# Patient Record
Sex: FEMALE | Race: WHITE | NOT HISPANIC OR LATINO | Employment: OTHER | URBAN - METROPOLITAN AREA
[De-identification: names, ages, dates, MRNs, and addresses within clinical notes are randomized per-mention and may not be internally consistent; named-entity substitution may affect disease eponyms.]

---

## 2024-01-25 PROBLEM — J30.89 ENVIRONMENTAL AND SEASONAL ALLERGIES: Status: ACTIVE | Noted: 2024-01-25

## 2024-01-25 PROBLEM — K11.1 SUBMANDIBULAR GLAND HYPERTROPHY: Status: ACTIVE | Noted: 2024-01-25

## 2024-01-25 PROBLEM — H93.13 BILATERAL TINNITUS: Status: ACTIVE | Noted: 2024-01-25

## 2024-01-25 PROBLEM — H69.93 DYSFUNCTION OF BOTH EUSTACHIAN TUBES: Status: ACTIVE | Noted: 2024-01-25

## 2024-01-25 PROBLEM — J34.89 NASAL DRAINAGE: Status: ACTIVE | Noted: 2024-01-25

## 2024-01-25 PROBLEM — H60.313 CHRONIC DIFFUSE OTITIS EXTERNA OF BOTH EARS: Status: ACTIVE | Noted: 2024-01-25

## 2024-01-25 PROBLEM — R09.81 NASAL CONGESTION: Status: ACTIVE | Noted: 2024-01-25

## 2024-01-25 PROBLEM — K11.8 SUBMANDIBULAR GLAND MASS: Status: ACTIVE | Noted: 2024-01-25

## 2024-01-25 PROBLEM — K11.5 SIALOLITHIASIS: Status: ACTIVE | Noted: 2024-01-25

## 2024-01-25 PROBLEM — K11.20 SIALADENITIS: Status: ACTIVE | Noted: 2024-01-25

## 2024-01-25 PROBLEM — H61.23 EXCESSIVE EAR WAX, BILATERAL: Status: ACTIVE | Noted: 2024-01-25

## 2024-01-25 PROBLEM — E04.1 SOLITARY THYROID NODULE: Status: ACTIVE | Noted: 2024-01-25

## 2024-02-14 ENCOUNTER — OFFICE VISIT (OUTPATIENT)
Dept: OTOLARYNGOLOGY | Facility: CLINIC | Age: 74
End: 2024-02-14
Payer: MEDICARE

## 2024-02-14 VITALS — BODY MASS INDEX: 38.83 KG/M2 | WEIGHT: 180 LBS | HEIGHT: 57 IN

## 2024-02-14 DIAGNOSIS — H90.0 CONDUCTIVE HEARING LOSS, BILATERAL: ICD-10-CM

## 2024-02-14 DIAGNOSIS — H61.23 EXCESSIVE EAR WAX, BILATERAL: Primary | ICD-10-CM

## 2024-02-14 PROBLEM — I49.9 CARDIAC ARRHYTHMIA: Status: ACTIVE | Noted: 2019-07-19

## 2024-02-14 PROBLEM — G47.33 OSA ON CPAP: Status: ACTIVE | Noted: 2019-07-19

## 2024-02-14 PROBLEM — E04.9 NONTOXIC GOITER, UNSPECIFIED: Status: ACTIVE | Noted: 2019-07-19

## 2024-02-14 PROCEDURE — 69210 REMOVE IMPACTED EAR WAX UNI: CPT | Performed by: STUDENT IN AN ORGANIZED HEALTH CARE EDUCATION/TRAINING PROGRAM

## 2024-02-14 PROCEDURE — 99213 OFFICE O/P EST LOW 20 MIN: CPT | Performed by: STUDENT IN AN ORGANIZED HEALTH CARE EDUCATION/TRAINING PROGRAM

## 2024-02-14 PROCEDURE — 1159F MED LIST DOCD IN RCRD: CPT | Performed by: STUDENT IN AN ORGANIZED HEALTH CARE EDUCATION/TRAINING PROGRAM

## 2024-02-14 PROCEDURE — 1036F TOBACCO NON-USER: CPT | Performed by: STUDENT IN AN ORGANIZED HEALTH CARE EDUCATION/TRAINING PROGRAM

## 2024-02-14 RX ORDER — METFORMIN HYDROCHLORIDE 500 MG/1
500 TABLET, EXTENDED RELEASE ORAL
COMMUNITY
Start: 2023-12-28

## 2024-02-14 RX ORDER — SOTALOL HYDROCHLORIDE 80 MG/1
80 TABLET ORAL EVERY 12 HOURS
COMMUNITY

## 2024-02-14 RX ORDER — VIT C/E/ZN/COPPR/LUTEIN/ZEAXAN 250MG-90MG
25 CAPSULE ORAL DAILY
COMMUNITY
Start: 2019-04-11

## 2024-02-14 RX ORDER — OMEPRAZOLE 40 MG/1
40 CAPSULE, DELAYED RELEASE ORAL
COMMUNITY
Start: 2023-12-28

## 2024-02-14 RX ORDER — ASPIRIN 81 MG/1
81 TABLET ORAL DAILY
COMMUNITY

## 2024-02-14 NOTE — PROGRESS NOTES
Assessment  Bilateral sensorineural hearing loss  Bilateral tinnitus  Bilateral cerumen impaction     Plan  Bilateral cerumen impaction removed, preventive measures discussed.  Hearing returned to baseline following cerumen removal.  Audiogram performed 8/14/23 notable for bilateral sensorineural hearing loss with minimal changes when compared to audiogram performed 1 year ago. Treatment alternatives discussed, at this time the patient is not interested in hearing aids.  Masking techniques for tinnitus management discussed  We will monitor her hearing with yearly audiograms  Fu 6m         History of Present Illness  2/14/23   The patient presents for follow-up, reports developing bilateral cerumen buildup affecting her hearing, no other otologic complaints.  Recall   8/14/23  The patient present for follow-up, reports developing left cerumen buildup affecting hearing.   Endorses bilateral high-frequency tinnitus, no other otologic complaint.  Recall   72-year-old female presenting for initial evaluation of bilateral cerumen impaction and hearing loss. She is a former patient of Dr. Bright who has recently retired.  The patient reports developing bilateral cerumen buildup for the past months, this is interfering with her hearing.  Has a history of bilateral sensorineural hearing loss and is due for yearly audiogram.  Endorses occasional bilateral high-frequency tinnitus, otherwise no otologic complaints.   Data reviewed:   Dr. Bright note 2/24/22  Audiogram 3/10/21    Past Medical History:   Diagnosis Date    Disease of stomach and duodenum, unspecified     Stomach problems    Obstructive sleep apnea (adult) (pediatric)     ANTOINE (obstructive sleep apnea)    Other conditions influencing health status     Malignant neoplasm of left female breast, unspecified site of breast    Personal history of other drug therapy     COVID-19 vaccine series completed    Tachycardia, unspecified     Tachycardia       Past Surgical  History:   Procedure Laterality Date    BREAST LUMPECTOMY  06/08/2016    Right Breast Lumpectomy    BREAST SURGERY  06/08/2016    Breast Surgery    HAND SURGERY  06/08/2016    Hand Surgery                                                                                                                                                          TONSILLECTOMY  06/08/2016    Tonsillectomy With Adenoidectomy         Current Outpatient Medications on File Prior to Visit   Medication Sig Dispense Refill    cholecalciferol (Vitamin D-3) 25 MCG (1000 UT) capsule Take 1 capsule (25 mcg) by mouth once daily.      metFORMIN  mg 24 hr tablet Take 1 tablet (500 mg) by mouth once daily in the evening. Take with meals.      omeprazole (PriLOSEC) 40 mg DR capsule Take 1 capsule (40 mg) by mouth once daily in the morning. Take before meals.      aspirin 81 mg EC tablet Take 1 tablet (81 mg) by mouth once daily.      sotalol (Betapace) 80 mg tablet Take 1 tablet (80 mg) by mouth every 12 hours.       No current facility-administered medications on file prior to visit.        Allergies   Allergen Reactions    Amoxicillin-Pot Clavulanate Unknown    Nabumetone Unknown    Sulfamethoxazole Unknown    Sulfamethoxazole-Trimethoprim Unknown        Review of Systems  A detailed 12 point ROS was performed and is negative except as noted in the intake form, HPI and/or Past Medical History      Physical Exam  CONSTITUTIONAL: Well-developed, NAD  VOICE: Normal voice quality  RESPIRATION: Breathing comfortably, no stridor.  CV: No clubbing/cyanosis/edema in hands.  EYES: EOM Intact, sclera normal.  NEURO: Alert and oriented times 3, Cranial nerves V,VII intact and symmetric bilaterally.  HEAD AND FACE: Symmetric facial features, no masses or lesions, sinuses nontender to palpation.  SALIVARY GLANDS: Parotid and submandibular glands normal bilaterally.  + EARS: Normal external ears  Bilateral EACs with cerumen impaction (removed/see procedure  note)  Right EAC patent, tympanic membrane intact  Left EAC patent, tympanic membrane intact   NOSE: External nose midline, anterior rhinoscopy is normal with limited visualization to the anterior aspect of the interior turbinates. No lesions noted.  ORAL CAVITY/OROPHARYNX/LIPS: Normal mucous membranes, normal floor of mouth/tongue/OP, no masses or lesions are noted.  PHARYNGEAL WALLS AND NASOPHARYNX: No masses noted. Mucosa appears clean and moist  NECK/LYMPH: No LAD, no thyroid masses. Trachea palpably midline  SKIN: Neck skin is without injury  PSYCH: Alert and oriented with appropriate mood and affect    Procedure: Removal of impacted cerumen, bilateral  Indication: Cerumen impaction.   The procedure was reviewed and explained.  Verbal consent was obtained.  With the use of the otoscope the right ear was examined, cerumen was cleaned with the use of curettes and suction.  Attention was turned to the left ear, with the use of the otoscope the left ear was examined, cerumen was cleaned with the use of curettes and suction. The patient tolerated the procedure well.  Hearing returned to baseline bilaterally following cerumen removal.

## 2024-09-16 ENCOUNTER — APPOINTMENT (OUTPATIENT)
Dept: AUDIOLOGY | Facility: CLINIC | Age: 74
End: 2024-09-16
Payer: MEDICARE

## 2024-09-16 ENCOUNTER — APPOINTMENT (OUTPATIENT)
Dept: OTOLARYNGOLOGY | Facility: CLINIC | Age: 74
End: 2024-09-16
Payer: MEDICARE

## 2024-09-27 ENCOUNTER — APPOINTMENT (OUTPATIENT)
Dept: OTOLARYNGOLOGY | Facility: CLINIC | Age: 74
End: 2024-09-27
Payer: MEDICARE

## 2024-09-27 ENCOUNTER — APPOINTMENT (OUTPATIENT)
Dept: AUDIOLOGY | Facility: CLINIC | Age: 74
End: 2024-09-27
Payer: MEDICARE

## 2024-11-20 ENCOUNTER — APPOINTMENT (OUTPATIENT)
Dept: AUDIOLOGY | Facility: CLINIC | Age: 74
End: 2024-11-20
Payer: MEDICARE

## 2024-11-20 ENCOUNTER — APPOINTMENT (OUTPATIENT)
Dept: OTOLARYNGOLOGY | Facility: CLINIC | Age: 74
End: 2024-11-20
Payer: MEDICARE

## 2024-11-20 VITALS — BODY MASS INDEX: 38.83 KG/M2 | HEIGHT: 57 IN | WEIGHT: 180 LBS

## 2024-11-20 DIAGNOSIS — H90.3 SENSORINEURAL HEARING LOSS (SNHL) OF BOTH EARS: Primary | ICD-10-CM

## 2024-11-20 DIAGNOSIS — H93.13 BILATERAL TINNITUS: ICD-10-CM

## 2024-11-20 DIAGNOSIS — H90.3 SENSORINEURAL HEARING LOSS (SNHL) OF BOTH EARS: ICD-10-CM

## 2024-11-20 DIAGNOSIS — H61.23 BILATERAL IMPACTED CERUMEN: Primary | ICD-10-CM

## 2024-11-20 DIAGNOSIS — H69.93 DYSFUNCTION OF BOTH EUSTACHIAN TUBES: ICD-10-CM

## 2024-11-20 DIAGNOSIS — R42 DIZZINESS: ICD-10-CM

## 2024-11-20 PROCEDURE — 99214 OFFICE O/P EST MOD 30 MIN: CPT | Performed by: STUDENT IN AN ORGANIZED HEALTH CARE EDUCATION/TRAINING PROGRAM

## 2024-11-20 PROCEDURE — 69210 REMOVE IMPACTED EAR WAX UNI: CPT | Performed by: STUDENT IN AN ORGANIZED HEALTH CARE EDUCATION/TRAINING PROGRAM

## 2024-11-20 PROCEDURE — 3008F BODY MASS INDEX DOCD: CPT | Performed by: STUDENT IN AN ORGANIZED HEALTH CARE EDUCATION/TRAINING PROGRAM

## 2024-11-20 PROCEDURE — 92567 TYMPANOMETRY: CPT | Performed by: AUDIOLOGIST

## 2024-11-20 PROCEDURE — 92557 COMPREHENSIVE HEARING TEST: CPT | Performed by: AUDIOLOGIST

## 2024-11-20 PROCEDURE — 1159F MED LIST DOCD IN RCRD: CPT | Performed by: STUDENT IN AN ORGANIZED HEALTH CARE EDUCATION/TRAINING PROGRAM

## 2024-11-20 PROCEDURE — 1036F TOBACCO NON-USER: CPT | Performed by: STUDENT IN AN ORGANIZED HEALTH CARE EDUCATION/TRAINING PROGRAM

## 2024-11-20 NOTE — PROGRESS NOTES
COMPREHENSIVE AUDIOMETRIC EVALUATION      Name:  Damaris Shah  :  1950  Age:  74 y.o.  Date of Evaluation:  24   Referring Provider:   Gianni Lorenzo MD       History:  Ms. Shah was seen today for an evaluation of hearing.  Patient reported intermittent tinnitus greater in the left than the right ear.  She additionally reported intermittent dizziness with one fall in the past year, without injury.  When asked, patient denied past HA use otalgia, aural fullness, concerns for decreased hearing sensitivity, and medical history significant for diabetes    See audiometric evaluation at end of this report or scanned under media tab    OTOSCOPY:       Right Ear: Minimal non-occluding cerumen       Left Ear: Minimal non-occluding cerumen    226 Hz TYMPANOMETRY:       Right Ear: Type As: low compliance, normal peak pressure and normal ear canal volume, which may be consistent with eustachian tube dysfunction       Left Ear: Type As: low compliance, normal peak pressure and normal ear canal volume, which may be consistent with eustachian tube dysfunction    AUDIOMETRIC EVALUATION (Phones):       Right Ear: Mild sloping to Severe, Sensorineural hearing loss                 Left Ear: Mild sloping to Severe, Sensorineural hearing loss           NOTE: Hearing sensitivity consistent with most recent audiometric evaluation 2023    Test technique:  Standard Audiometry  Reliability:   good    SPEECH RECOGNITION THRESHOLD:       Right Ear:  35 dBHL in good agreement with PTA       Left Ear:  35 dBHL in good agreement with PTA    WORD RECOGNITION:       Right Ear:  excellent (90%) at elevated presentation level       Left Ear:  excellent (100%) at elevated presentation level    DISCUSSION:   Discussed results and recommendations with patient.  Questions were addressed and the patient was encouraged to contact our department should concerns arise.    RECOMMENDATIONS:  -Recommend patient return for hearing  aid evaluation  -Recommend patient referral to physical therapy for fall prevention, balance confidence, and due to imbalance.  -Recommend patient return for repeated audiometric evaluation should concerns for changes in hearing sensitivity arise or as medically indicated.    Josué Hoang, CCC-A     Appt: 11:00 - 11:20 AM

## 2024-11-20 NOTE — PROGRESS NOTES
Assessment  Bilateral sensorineural hearing loss  Bilateral tinnitus  Bilateral cerumen impaction     Plan  Bilateral cerumen impaction removed, preventive measures discussed.   Audiogram performed today showing stable bilateral sensorineural hearing loss.  Treatment alternatives discussed, at this time the patient is not interested in hearing aids.  Masking techniques for tinnitus management discussed  RTC 6m          History of Present Illness  11/20/24  Reports developing bilateral cerumen buildup, otherwise no otologic complaints.  8/14/23  The patient present for follow-up, reports developing left cerumen buildup affecting hearing.   Endorses bilateral high-frequency tinnitus, no other otologic complaint.  Recall   72-year-old female presenting for initial evaluation of bilateral cerumen impaction and hearing loss. She is a former patient of Dr. Bright who has recently retired.  The patient reports developing bilateral cerumen buildup for the past months, this is interfering with her hearing.  Has a history of bilateral sensorineural hearing loss and is due for yearly audiogram.  Endorses occasional bilateral high-frequency tinnitus, otherwise no otologic complaints.   Data reviewed:   Dr. Bright note 2/24/22  Audiogram 3/10/21    Past Medical History:   Diagnosis Date    Disease of stomach and duodenum, unspecified     Stomach problems    Obstructive sleep apnea (adult) (pediatric)     ANTOINE (obstructive sleep apnea)    Other conditions influencing health status     Malignant neoplasm of left female breast, unspecified site of breast    Personal history of other drug therapy     COVID-19 vaccine series completed    Tachycardia, unspecified     Tachycardia       Past Surgical History:   Procedure Laterality Date    BREAST LUMPECTOMY  06/08/2016    Right Breast Lumpectomy    BREAST SURGERY  06/08/2016    Breast Surgery    HAND SURGERY  06/08/2016    Hand Surgery                                                                                                                                                           TONSILLECTOMY  06/08/2016    Tonsillectomy With Adenoidectomy         Current Outpatient Medications on File Prior to Visit   Medication Sig Dispense Refill    aspirin 81 mg EC tablet Take 1 tablet (81 mg) by mouth once daily.      cholecalciferol (Vitamin D-3) 25 MCG (1000 UT) capsule Take 1 capsule (25 mcg) by mouth once daily.      metFORMIN  mg 24 hr tablet Take 1 tablet (500 mg) by mouth once daily in the evening. Take with meals.      omeprazole (PriLOSEC) 40 mg DR capsule Take 1 capsule (40 mg) by mouth once daily in the morning. Take before meals.      sotalol (Betapace) 80 mg tablet Take 1 tablet (80 mg) by mouth every 12 hours.       No current facility-administered medications on file prior to visit.        Allergies   Allergen Reactions    Amoxicillin-Pot Clavulanate Unknown    Nabumetone Unknown    Sulfamethoxazole Unknown    Sulfamethoxazole-Trimethoprim Unknown        Review of Systems  A detailed 12 point ROS was performed and is negative except as noted in the intake form, HPI and/or Past Medical History      Physical Exam  CONSTITUTIONAL: Well-developed, NAD  VOICE: Normal voice quality  RESPIRATION: Breathing comfortably, no stridor.  CV: No clubbing/cyanosis/edema in hands.  EYES: EOM Intact, sclera normal.  NEURO: Alert and oriented times 3, Cranial nerves V,VII intact and symmetric bilaterally.  HEAD AND FACE: Symmetric facial features, no masses or lesions, sinuses nontender to palpation.  SALIVARY GLANDS: Parotid and submandibular glands normal bilaterally.  + EARS: Normal external ears  Bilateral EACs with cerumen impaction (removed/see procedure note)  Right EAC patent, tympanic membrane intact  Left EAC patent, tympanic membrane intact   NOSE: External nose midline, anterior rhinoscopy is normal with limited visualization to the anterior aspect of the interior turbinates. No  lesions noted.  ORAL CAVITY/OROPHARYNX/LIPS: Normal mucous membranes, normal floor of mouth/tongue/OP, no masses or lesions are noted.  PHARYNGEAL WALLS AND NASOPHARYNX: No masses noted. Mucosa appears clean and moist  NECK/LYMPH: No LAD, no thyroid masses. Trachea palpably midline  SKIN: Neck skin is without injury  PSYCH: Alert and oriented with appropriate mood and affect    Procedure: Removal of impacted cerumen, bilateral  Indication: Cerumen impaction.   The procedure was reviewed and explained.  Verbal consent was obtained.  With the use of the otoscope the right ear was examined, cerumen was cleaned with the use of curettes and suction.  Attention was turned to the left ear, with the use of the otoscope the left ear was examined, cerumen was cleaned with the use of curettes and suction. The patient tolerated the procedure well.  Hearing returned to baseline bilaterally following cerumen removal.    Results:   Audiogram 11/20/2024 personally reviewed showing normal hearing bilaterally up to 250 Hz sloping to bilateral severe sensorineural hearing loss.  Speech discrimination score was 90% on the right and 100% on the left ear.  Type As tympanograms.

## 2025-05-21 ENCOUNTER — APPOINTMENT (OUTPATIENT)
Dept: OTOLARYNGOLOGY | Facility: CLINIC | Age: 75
End: 2025-05-21
Payer: MEDICARE

## 2025-05-21 VITALS — HEIGHT: 57 IN | WEIGHT: 180 LBS | BODY MASS INDEX: 38.83 KG/M2

## 2025-05-21 DIAGNOSIS — H61.23 BILATERAL IMPACTED CERUMEN: Primary | ICD-10-CM

## 2025-05-21 DIAGNOSIS — H90.0 CONDUCTIVE HEARING LOSS, BILATERAL: ICD-10-CM

## 2025-05-21 RX ORDER — ATORVASTATIN CALCIUM 10 MG/1
1 TABLET, FILM COATED ORAL
COMMUNITY
Start: 2025-05-16

## 2025-11-24 ENCOUNTER — APPOINTMENT (OUTPATIENT)
Dept: AUDIOLOGY | Facility: CLINIC | Age: 75
End: 2025-11-24
Payer: MEDICARE

## 2025-11-24 ENCOUNTER — APPOINTMENT (OUTPATIENT)
Dept: OTOLARYNGOLOGY | Facility: CLINIC | Age: 75
End: 2025-11-24
Payer: MEDICARE